# Patient Record
Sex: MALE
[De-identification: names, ages, dates, MRNs, and addresses within clinical notes are randomized per-mention and may not be internally consistent; named-entity substitution may affect disease eponyms.]

---

## 2020-01-21 ENCOUNTER — NURSE TRIAGE (OUTPATIENT)
Dept: OTHER | Facility: CLINIC | Age: 60
End: 2020-01-21

## 2020-01-22 NOTE — TELEPHONE ENCOUNTER
Reason for Disposition   Ear congestion present > 48 hours    Protocols used: EAR - CONGESTION-ADULT-    Patient believes he has ear wax build up. He states he does use Q tips and has intermittent periods of time when he cant hear well out of one ear and it feels clogged. He states he had a cold at the beginning of the year. Discussed with him that it could be left over congestion in the ear from the cold. He denies ear drainage, pain, redness or swelling. No fever. No cold sx. Discussed ear wax treatment as well as ear congestion.   Recommend increase fluid intake, cool mist humidifier, nasal saline mist.